# Patient Record
Sex: FEMALE | Race: BLACK OR AFRICAN AMERICAN | Employment: FULL TIME | URBAN - METROPOLITAN AREA
[De-identification: names, ages, dates, MRNs, and addresses within clinical notes are randomized per-mention and may not be internally consistent; named-entity substitution may affect disease eponyms.]

---

## 2018-02-07 ENCOUNTER — HOSPITAL ENCOUNTER (OUTPATIENT)
Dept: MAMMOGRAPHY | Age: 48
Discharge: HOME | End: 2018-02-07
Attending: OBSTETRICS & GYNECOLOGY
Payer: COMMERCIAL

## 2018-02-07 DIAGNOSIS — Z12.31 VISIT FOR SCREENING MAMMOGRAM: ICD-10-CM

## 2018-02-07 PROCEDURE — 77067 SCR MAMMO BI INCL CAD: CPT

## 2019-02-11 ENCOUNTER — HOSPITAL ENCOUNTER (OUTPATIENT)
Dept: MAMMOGRAPHY | Age: 49
Discharge: HOME | End: 2019-02-11
Attending: OBSTETRICS & GYNECOLOGY
Payer: COMMERCIAL

## 2019-02-11 DIAGNOSIS — Z12.31 VISIT FOR SCREENING MAMMOGRAM: ICD-10-CM

## 2019-02-11 PROCEDURE — 77067 SCR MAMMO BI INCL CAD: CPT

## 2019-03-26 PROBLEM — Z12.11 SCREENING FOR COLON CANCER: Status: ACTIVE | Noted: 2019-03-26

## 2019-09-30 ENCOUNTER — APPOINTMENT (OUTPATIENT)
Dept: CT IMAGING | Age: 49
End: 2019-09-30
Attending: EMERGENCY MEDICINE
Payer: COMMERCIAL

## 2019-09-30 ENCOUNTER — HOSPITAL ENCOUNTER (EMERGENCY)
Age: 49
Discharge: HOME | End: 2019-10-01
Attending: EMERGENCY MEDICINE
Payer: COMMERCIAL

## 2019-09-30 VITALS
RESPIRATION RATE: 16 BRPM | BODY MASS INDEX: 30.9 KG/M2 | TEMPERATURE: 97.6 F | HEART RATE: 60 BPM | OXYGEN SATURATION: 100 % | WEIGHT: 180 LBS | SYSTOLIC BLOOD PRESSURE: 158 MMHG | DIASTOLIC BLOOD PRESSURE: 85 MMHG

## 2019-09-30 DIAGNOSIS — R42 DIZZINESS: Primary | ICD-10-CM

## 2019-09-30 LAB
ALBUMIN SERPL-MCNC: 3.6 G/DL (ref 3.4–5)
ALBUMIN/GLOB SERPL: 0.8 {RATIO} (ref 0.8–1.7)
ALP SERPL-CCNC: 96 U/L (ref 45–117)
ALT SERPL-CCNC: 30 U/L (ref 13–56)
ANION GAP SERPL CALC-SCNC: 6 MMOL/L (ref 3–18)
AST SERPL-CCNC: 24 U/L (ref 10–38)
BASOPHILS # BLD: 0 K/UL (ref 0–0.1)
BASOPHILS NFR BLD: 1 % (ref 0–2)
BILIRUB SERPL-MCNC: 0.4 MG/DL (ref 0.2–1)
BUN SERPL-MCNC: 13 MG/DL (ref 7–18)
BUN/CREAT SERPL: 15 (ref 12–20)
CALCIUM SERPL-MCNC: 9.3 MG/DL (ref 8.5–10.1)
CHLORIDE SERPL-SCNC: 108 MMOL/L (ref 100–111)
CK MB CFR SERPL CALC: NORMAL % (ref 0–4)
CK MB SERPL-MCNC: <1 NG/ML (ref 5–25)
CK SERPL-CCNC: 164 U/L (ref 26–192)
CO2 SERPL-SCNC: 28 MMOL/L (ref 21–32)
CREAT SERPL-MCNC: 0.87 MG/DL (ref 0.6–1.3)
DIFFERENTIAL METHOD BLD: ABNORMAL
EOSINOPHIL # BLD: 0.2 K/UL (ref 0–0.4)
EOSINOPHIL NFR BLD: 4 % (ref 0–5)
ERYTHROCYTE [DISTWIDTH] IN BLOOD BY AUTOMATED COUNT: 12.7 % (ref 11.6–14.5)
GLOBULIN SER CALC-MCNC: 4.4 G/DL (ref 2–4)
GLUCOSE SERPL-MCNC: 119 MG/DL (ref 74–99)
HCT VFR BLD AUTO: 36.2 % (ref 35–45)
HGB BLD-MCNC: 11.8 G/DL (ref 12–16)
LYMPHOCYTES # BLD: 2.6 K/UL (ref 0.9–3.6)
LYMPHOCYTES NFR BLD: 43 % (ref 21–52)
MCH RBC QN AUTO: 29.6 PG (ref 24–34)
MCHC RBC AUTO-ENTMCNC: 32.6 G/DL (ref 31–37)
MCV RBC AUTO: 91 FL (ref 74–97)
MONOCYTES # BLD: 0.3 K/UL (ref 0.05–1.2)
MONOCYTES NFR BLD: 4 % (ref 3–10)
NEUTS SEG # BLD: 3 K/UL (ref 1.8–8)
NEUTS SEG NFR BLD: 48 % (ref 40–73)
PLATELET # BLD AUTO: 315 K/UL (ref 135–420)
PMV BLD AUTO: 9.4 FL (ref 9.2–11.8)
POTASSIUM SERPL-SCNC: 3.6 MMOL/L (ref 3.5–5.5)
PROT SERPL-MCNC: 8 G/DL (ref 6.4–8.2)
RBC # BLD AUTO: 3.98 M/UL (ref 4.2–5.3)
SODIUM SERPL-SCNC: 142 MMOL/L (ref 136–145)
TROPONIN I SERPL-MCNC: <0.02 NG/ML (ref 0–0.04)
WBC # BLD AUTO: 6.1 K/UL (ref 4.6–13.2)

## 2019-09-30 PROCEDURE — 85025 COMPLETE CBC W/AUTO DIFF WBC: CPT

## 2019-09-30 PROCEDURE — 93005 ELECTROCARDIOGRAM TRACING: CPT

## 2019-09-30 PROCEDURE — 82550 ASSAY OF CK (CPK): CPT

## 2019-09-30 PROCEDURE — 94760 N-INVAS EAR/PLS OXIMETRY 1: CPT

## 2019-09-30 PROCEDURE — 99284 EMERGENCY DEPT VISIT MOD MDM: CPT

## 2019-09-30 PROCEDURE — 80053 COMPREHEN METABOLIC PANEL: CPT

## 2019-09-30 PROCEDURE — 70450 CT HEAD/BRAIN W/O DYE: CPT

## 2019-09-30 PROCEDURE — 74011250636 HC RX REV CODE- 250/636: Performed by: EMERGENCY MEDICINE

## 2019-09-30 PROCEDURE — 99285 EMERGENCY DEPT VISIT HI MDM: CPT

## 2019-09-30 RX ORDER — MECLIZINE HCL 12.5 MG 12.5 MG/1
25 TABLET ORAL DAILY
Status: DISCONTINUED | OUTPATIENT
Start: 2019-10-01 | End: 2019-10-01 | Stop reason: HOSPADM

## 2019-09-30 RX ADMIN — MECLIZINE 25 MG: 12.5 TABLET ORAL at 22:42

## 2019-09-30 NOTE — LETTER
[address] [place] [address] [facility] 
[address] [facility] [id number] [phone] Work/School Note Date: 9/30/2019 To Whom It May concern: 
 
Alex Miranda was seen and treated today in the emergency room by the following provider(s): 
Attending Provider: Edenilson Bella MD.   
 
 
Alex Miranda - please excuse her from work 10/1 and 10/2 (2019). Sincerely, Coni Eubanks RN

## 2019-10-01 LAB
ATRIAL RATE: 56 BPM
CALCULATED P AXIS, ECG09: 50 DEGREES
CALCULATED R AXIS, ECG10: 21 DEGREES
CALCULATED T AXIS, ECG11: 37 DEGREES
DIAGNOSIS, 93000: NORMAL
P-R INTERVAL, ECG05: 200 MS
Q-T INTERVAL, ECG07: 428 MS
QRS DURATION, ECG06: 88 MS
QTC CALCULATION (BEZET), ECG08: 413 MS
VENTRICULAR RATE, ECG03: 56 BPM

## 2019-10-01 RX ORDER — MECLIZINE HYDROCHLORIDE 25 MG/1
TABLET ORAL
Qty: 20 TAB | Refills: 0 | Status: SHIPPED | OUTPATIENT
Start: 2019-10-01

## 2019-10-01 NOTE — DISCHARGE INSTRUCTIONS
Patient Education        Epley Maneuver at Home for Vertigo: Exercises  Introduction  Vertigo is a spinning or whirling sensation when you move your head. Your doctor may have moved you in different positions to help your vertigo get better faster. This is called the Epley maneuver. Your doctor also may have asked you to do these exercises at home. Do the exercises as often as your doctor recommends. If your vertigo is getting worse, your doctor may have you change the exercise or stop it. Step 1  Step 1    1. Sit on the edge of a bed or sofa. Step 2    1. Turn your head 45 degrees in the direction your doctor told you to. This should be toward the ear that causes the most vertigo for you. In this picture, the woman is turning toward her left ear. Step 3    1. Tilt yourself backward until you are lying on your back. Your head should still be at a 45-degree turn. Your head should be about midway between looking straight ahead and looking out to your side. Hold for 30 seconds. If you have vertigo, stay in this position until it stops. Step 4    1. Turn your head 90 degrees toward the ear that has the least vertigo. In this picture, the woman is turning to the right because she has vertigo on her left side. The point of your chin should be raised and over your shoulder. Hold for 30 seconds. Step 5    1. Roll onto the side with the least vertigo. You should now be looking at the floor. Hold for 30 seconds. Follow-up care is a key part of your treatment and safety. Be sure to make and go to all appointments, and call your doctor if you are having problems. It's also a good idea to know your test results and keep a list of the medicines you take. Where can you learn more? Go to http://lori-donnell.info/. Enter 470 5122 in the search box to learn more about \"Epley Maneuver at Home for Vertigo: Exercises. \"  Current as of: March 28, 2019  Content Version: 12.2  © 7271-3284 Healthwise[employer]. Care instructions adapted under license by [employer] (which disclaims liability or warranty for this information). If you have questions about a medical condition or this instruction, always ask your healthcare professional. [address] any warranty or liability for your use of this information. Patient Education        Lightheadedness or Faintness: Care Instructions  Your Care Instructions  Lightheadedness is a feeling that you are about to faint or \"pass out. \" You do not feel as if you or your surroundings are moving. It is different from vertigo, which is the feeling that you or things around you are spinning or tilting. Lightheadedness usually goes away or gets better when you lie down. If lightheadedness gets worse, it can lead to a fainting spell. It is common to feel lightheaded from time to time. Lightheadedness usually is not caused by a serious problem. It often is caused by a short-lasting drop in blood pressure and blood flow to your head that occurs when you get up too quickly from a seated or lying position. Follow-up care is a key part of your treatment and safety. Be sure to make and go to all appointments, and call your doctor if you are having problems. It's also a good idea to know your test results and keep a list of the medicines you take. How can you care for yourself at home? · Lie down for 1 or 2 minutes when you feel lightheaded. After lying down, sit up slowly and remain sitting for 1 to 2 minutes before slowly standing up. · Avoid movements, positions, or activities that have made you lightheaded in the past.  · Get plenty of rest, especially if you have a cold or flu, which can cause lightheadedness. · Make sure you drink plenty of fluids, especially if you have a fever or have been sweating. · Do not drive or put yourself and others in danger while you feel lightheaded. When should you call for help?   Call 911 anytime you think you may need emergency care. For example, call if:    · You have symptoms of a stroke. These may include:  ? Sudden numbness, tingling, weakness, or loss of movement in your face, arm, or leg, especially on only one side of your body. ? Sudden vision changes. ? Sudden trouble speaking. ? Sudden confusion or trouble understanding simple statements. ? Sudden problems with walking or balance. ? A sudden, severe headache that is different from past headaches.     · You have symptoms of a heart attack. These may include:  ? Chest pain or pressure, or a strange feeling in the chest.  ? Sweating. ? Shortness of breath. ? Nausea or vomiting. ? Pain, pressure, or a strange feeling in the back, neck, jaw, or upper belly or in one or both shoulders or arms. ? Lightheadedness or sudden weakness. ? A fast or irregular heartbeat. After you call 911, the  may tell you to chew 1 adult-strength or 2 to 4 low-dose aspirin. Wait for an ambulance. Do not try to drive yourself.    Watch closely for changes in your health, and be sure to contact your doctor if:    · Your lightheadedness gets worse or does not get better with home care. Where can you learn more? Go to http://lori-donnell.info/. Enter I229 in the search box to learn more about \"Lightheadedness or Faintness: Care Instructions. \"  Current as of: June 26, 2019  Content Version: 12.2  © 3508-2804 [employer]. Care instructions adapted under license by [employer] (which disclaims liability or warranty for this information). If you have questions about a medical condition or this instruction, always ask your healthcare professional. [place] [address] any warranty or liability for your use of this information.

## 2019-10-01 NOTE — ED NOTES
1:39 AM  10/01/19     Discharge instructions given to Solis Armas (name) with verbalization of understanding. Patient accompanied by family. Patient discharged with the following prescriptions antivert. Patient discharged to home (destination).       Yvrose Mccann

## 2019-10-01 NOTE — ED NOTES
Pt affect calm/conversant, respirations regular/non labored/skin warm and dry. Denies pain/dizziness/shortness of breath/other concerns. Plan of care to include CT of the head. Pt denies further needs at this time.   Rad tech at bedside; pt to CT

## 2019-10-01 NOTE — ED TRIAGE NOTES
C/O dizziness starting around 2000 when she got home from work. Pt states \"It hit me all of a sudden, but I have just been getting over a bad cold\".

## 2019-10-01 NOTE — ED PROVIDER NOTES
HPI  Bianka Camp is a 45 yo female developed a sudden onset of dizziness starting around  when she got home from work. Pt states \"It hit me all of a sudden, but I have just been getting over a bad cold\". She describes the dizziness as lightheadedness with the room spinning.     Past Medical History:   Diagnosis Date    Hypertension        Past Surgical History:   Procedure Laterality Date    COLONOSCOPY N/A 3/26/2019    COLONOSCOPY,  w heated snared polypectomy performed by Beto Green MD at [facility] ENDOSCOPY    HX BREAST BIOPSY  2013    HX CHOLECYSTECTOMY      MULTIPLE DELIVERY       1986         Family History:   Problem Relation Age of Onset    SLE Mother        Social History     Socioeconomic History    Marital status: SINGLE     Spouse name: Not on file    Number of children: Not on file    Years of education: Not on file    Highest education level: Not on file   Occupational History    Not on file   Social Needs    Financial resource strain: Not on file    Food insecurity:     Worry: Not on file     Inability: Not on file    Transportation needs:     Medical: Not on file     Non-medical: Not on file   Tobacco Use    Smoking status: Never Smoker   Substance and Sexual Activity    Alcohol use: No    Drug use: No    Sexual activity: Not on file   Lifestyle    Physical activity:     Days per week: Not on file     Minutes per session: Not on file    Stress: Not on file   Relationships    Social connections:     Talks on phone: Not on file     Gets together: Not on file     Attends Anabaptism service: Not on file     Active member of club or organization: Not on file     Attends meetings of clubs or organizations: Not on file     Relationship status: Not on file    Intimate partner violence:     Fear of current or ex partner: Not on file     Emotionally abused: Not on file     Physically abused: Not on file     Forced sexual activity: Not on file   Other Topics Concern    Not on file   Social History Narrative    Not on file         ALLERGIES: Patient has no known allergies. Review of Systems   Constitutional: Negative. HENT: Negative. Eyes: Negative. Respiratory: Negative. Cardiovascular: Negative. Gastrointestinal: Negative. Endocrine: Negative. Genitourinary: Negative. Musculoskeletal: Negative. Skin: Negative. Allergic/Immunologic: Negative. Neurological: Negative. Hematological: Negative. Psychiatric/Behavioral: Negative. All other systems reviewed and are negative. Vitals:    09/30/19 2232   BP: 158/85   Pulse: 60   Resp: 16   Temp: 97.6 °F (36.4 °C)   SpO2: 100%   Weight: 81.6 kg (180 lb)            Physical Exam   Constitutional: She is oriented to person, place, and time. She appears well-developed and well-nourished. No distress. HENT:   Head: Normocephalic. Right Ear: External ear normal.   Left Ear: External ear normal.   Mouth/Throat: No oropharyngeal exudate. Eyes: Pupils are equal, round, and reactive to light. Conjunctivae and EOM are normal. Right eye exhibits no discharge. Left eye exhibits no discharge. No scleral icterus. Neck: Normal range of motion. Neck supple. No JVD present. No tracheal deviation present. No thyromegaly present. Cardiovascular: Normal rate, regular rhythm, normal heart sounds and intact distal pulses. Exam reveals no gallop and no friction rub. No murmur heard. Pulmonary/Chest: Effort normal and breath sounds normal. No stridor. No respiratory distress. She has no wheezes. She has no rales. She exhibits no tenderness. Abdominal: Soft. Bowel sounds are normal. She exhibits no distension and no mass. There is no tenderness. There is no rebound and no guarding. Musculoskeletal: Normal range of motion. She exhibits no edema or tenderness. Lymphadenopathy:     She has no cervical adenopathy. Neurological: She is alert and oriented to person, place, and time.  She displays normal reflexes. No cranial nerve deficit. She exhibits normal muscle tone. Coordination normal.   Skin: Skin is warm and dry. No rash noted. She is not diaphoretic. No erythema. No pallor. Nursing note and vitals reviewed. MDM: Differential diagnosis: URI, viral syndrome, labyrinthitis, TIA    Course: Patient remained stable    Diagnosis:    Disposition: Discharge home    Dictation disclaimer:  Please note that this dictation was completed with [email or web address], the computer voice recognition software. Quite often unanticipated grammatical, syntax, homophones, and other interpretive errors are inadvertently transcribed by the computer software. Please disregard these errors. Please excuse any errors that have escaped final proofreading.

## 2020-02-26 ENCOUNTER — HOSPITAL ENCOUNTER (OUTPATIENT)
Dept: MAMMOGRAPHY | Age: 50
Discharge: HOME | End: 2020-02-26
Attending: OBSTETRICS & GYNECOLOGY
Payer: COMMERCIAL

## 2020-02-26 DIAGNOSIS — Z12.31 VISIT FOR SCREENING MAMMOGRAM: ICD-10-CM

## 2020-02-26 PROCEDURE — 77063 BREAST TOMOSYNTHESIS BI: CPT

## 2020-06-09 ENCOUNTER — HOSPITAL ENCOUNTER (OUTPATIENT)
Dept: PHYSICAL THERAPY | Age: 50
Discharge: HOME | End: 2020-06-09
Payer: COMMERCIAL

## 2020-06-09 PROCEDURE — 97110 THERAPEUTIC EXERCISES: CPT

## 2020-06-09 PROCEDURE — 97162 PT EVAL MOD COMPLEX 30 MIN: CPT

## 2020-06-09 NOTE — PROGRESS NOTES
PT DAILY TREATMENT NOTE 10-18    Patient Name: Maverick Pink  Date:2020  : 1970  [x]  Patient  Verified  Payor: BLUE CROSS / Plan: [facility] PPO / Product Type: PPO /    In time:8:00  Out time:8:47  Total Treatment Time (min): 47  Visit #: 1 of 8    Medicare/BCBS Only   Total Timed Codes (min):  47 1:1 Treatment Time:  47       Treatment Area: Pain in right shoulder [M25.511]  Lumbar back pain [M54.5]    SUBJECTIVE  Pain Level (0-10 scale): 0/10  Any medication changes, allergies to medications, adverse drug reactions, diagnosis change, or new procedure performed?: [x] No    [] Yes (see summary sheet for update)  Subjective functional status/changes:   [] No changes reported  The patient has a chief complaint of right shoulder and back pain limiting ease of chore production and lifting. OBJECTIVE  37 min [x]Eval                  []Re-Eval       10 min Therapeutic Exercise:  [x] See flow sheet :   Rationale: increase ROM and increase strength to improve the patients ability to improve ADL ease. With   [] TE   [] TA   [] neuro   [] other: Patient Education: [x] Review HEP    [] Progressed/Changed HEP based on:   [] positioning   [] body mechanics   [] transfers   [] heat/ice application    [] other:      Other Objective/Functional Measures: See IE     Pain Level (0-10 scale) post treatment: 5/10    ASSESSMENT/Changes in Function: See POC. Patient will continue to benefit from skilled PT services to modify and progress therapeutic interventions, address functional mobility deficits, address ROM deficits, address strength deficits, analyze and address soft tissue restrictions, analyze and cue movement patterns, analyze and modify body mechanics/ergonomics, assess and modify postural abnormalities and instruct in home and community integration to attain remaining goals.      [x]  See Plan of Care  []  See progress note/recertification  []  See Discharge Summary         Progress towards goals / Updated goals:  Short Term Goals: To be accomplished in 2 weeks:               1. The patient will be independent and complaint with HEP to maximize therapeutic benefit. 2. The patient will improve lumbar flexion to finger tips to distal 1/3rd of tibia to maximize ease of chore production. Long Term Goals: To be accomplished in 4 weeks:               1. The patient will improve FOTO score to 56 to maximize quality of life. 2. The patient will improve right shoulder posterior capsule 90/90 IR to 30 degrees to improve ease of grooming. 3. The patient will demonstrate overhead reaching to 140 degrees to improve ease of overhead reaching. 4. The patient will report 75% improvement since [facility] to improve ease of ADLs.      PLAN  []  Upgrade activities as tolerated     [x]  Continue plan of care  []  Update interventions per flow sheet       []  Discharge due to:_  []  Other:_      Bee Hudson, PT 6/9/2020  8:57 AM    Future Appointments   Date Time Provider Claudia Brown   6/16/2020  8:00 AM Conda Cons, PT [facility]   6/19/2020  8:15 AM Hunter Walker, PT [facility]   6/23/2020  8:00 AM Conda Cons, PT [facility]   6/26/2020  7:30 AM Gopal Gutierrez, PT [facility]   6/30/2020  8:00 AM Conda Cons, PT [facility]   7/2/2020  8:15 AM Gopal Gutierrez, PT [facility]   7/7/2020  8:00 AM Conda Cons, PT [facility]   7/10/2020  8:15 AM Hunter Walker, PT [facility]
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
## 2020-06-16 ENCOUNTER — HOSPITAL ENCOUNTER (OUTPATIENT)
Dept: PHYSICAL THERAPY | Age: 50
Discharge: HOME | End: 2020-06-16
Payer: COMMERCIAL

## 2020-06-16 PROCEDURE — 97110 THERAPEUTIC EXERCISES: CPT

## 2020-06-16 PROCEDURE — 97140 MANUAL THERAPY 1/> REGIONS: CPT

## 2020-06-16 NOTE — PROGRESS NOTES
PT DAILY TREATMENT NOTE 10-18    Patient Name: Renetta Miles  Date:2020  : 1970  [x]  Patient  Verified  Payor: BLUE CROSS / Plan: [facility] PPO / Product Type: PPO /    In time:8:00  Out time:8:50  Total Treatment Time (min): 50  Visit #: 2 of 8    Medicare/BCBS Only   Total Timed Codes (min):  45 1:1 Treatment Time:  45       Treatment Area: Pain in right shoulder [M25.511]  Lumbar back pain [M54.5]    SUBJECTIVE  Pain Level (0-10 scale): 3/10  Any medication changes, allergies to medications, adverse drug reactions, diagnosis change, or new procedure performed?: [x] No    [] Yes (see summary sheet for update)  Subjective functional status/changes:   [] No changes reported  The patient states that her pain level is down, and she does report compliance with her HEP. OBJECTIVE  Modality rationale: decrease pain and increase tissue extensibility to improve the patients ability to improve ADL ease. Min Type Additional Details   5 []  Ice     [x]  heat  []  Ice massage  []  Laser   []  Anodyne Position: seated  Location: right shoulder   [] Skin assessment post-treatment:  []intact []redness- no adverse reaction    []redness  adverse reaction:       38 min Therapeutic Exercise:  [x] See flow sheet :   Rationale: increase ROM and increase strength to improve the patients ability to improve ADL ease. 8 min Manual Therapy:  Right GHJ emphasis of posterior capsule mobs per formed with the patient in supine at a grade III. Rationale: decrease pain, increase ROM and increase tissue extensibility to improve ADL ease. With   [] TE   [] TA   [] neuro   [] other: Patient Education: [x] Review HEP    [] Progressed/Changed HEP based on:   [] positioning   [] body mechanics   [] transfers   [] heat/ice application    [] other:      Other Objective/Functional Measures:   HEP compliance reported. Also notable improvement of posterior capsule reported.   Fatigued quickly with posterior pelvic tilting and core interventions. Pain Level (0-10 scale) post treatment: 5/10    ASSESSMENT/Changes in Function: Good first follow-up with the patient motivated and eager to follow instruction and perform exercises. Her passive mobility concerning her right shoulder grossly is improving. Patient will continue to benefit from skilled PT services to modify and progress therapeutic interventions, address functional mobility deficits, address ROM deficits, address strength deficits, analyze and address soft tissue restrictions, analyze and cue movement patterns, analyze and modify body mechanics/ergonomics, assess and modify postural abnormalities and instruct in home and community integration to attain remaining goals. []  See Plan of Care  []  See progress note/recertification  []  See Discharge Summary         Progress towards goals / Updated goals:  Short Term Goals: To be accomplished in 2 weeks:               1. The patient will be independent and complaint with HEP to maximize therapeutic benefit. IE: issued HEP   Current: Reports compliance with HEP 6/16/2020               2. The patient will improve lumbar flexion to finger tips to distal 1/3rd of tibia to maximize ease of chore production. IE: to tibial tuberosity  Long Term Goals: To be accomplished in 4 weeks:               1. The patient will improve FOTO score to 56 to maximize quality of life. IE: 50               2. The patient will improve right shoulder posterior capsule 90/90 IR to 30 degrees to improve ease of grooming. IE: 0 degrees right shoulder posterior capsule at 90/90               3. The patient will demonstrate overhead reaching to 140 degrees to improve ease of overhead reaching. IE: right shoulder 78 degrees               4. The patient will report 75% improvement since [facility] to improve ease of ADLs.    IE: 0% reported at IE.      PLAN  []  Upgrade activities as tolerated     [x]  Continue plan of care  [] Update interventions per flow sheet       []  Discharge due to:_  []  Other:_      Anil Early, PT 6/16/2020  8:13 AM    Future Appointments   Date Time Provider Claudia Brown   6/19/2020  8:15 AM Homar Goarlenele, PT [facility]   6/23/2020  8:00 AM Shola Walker, PT [facility]   6/26/2020  7:30 AM Renee Gutierrez, PT [facility]   6/30/2020  8:00 AM Homar Goarlenele, PT [facility]   7/2/2020  8:15 AM Renee Gutierrez, PT [facility]   7/7/2020  8:00 AM Homar Gosha, PT [facility]   7/10/2020  8:15 AM Shola Walker, PT [facility]

## 2020-06-23 ENCOUNTER — APPOINTMENT (OUTPATIENT)
Dept: PHYSICAL THERAPY | Age: 50
End: 2020-06-23
Payer: COMMERCIAL

## 2020-06-30 ENCOUNTER — HOSPITAL ENCOUNTER (OUTPATIENT)
Dept: PHYSICAL THERAPY | Age: 50
Discharge: HOME | End: 2020-06-30
Payer: COMMERCIAL

## 2020-06-30 PROCEDURE — 97140 MANUAL THERAPY 1/> REGIONS: CPT

## 2020-06-30 PROCEDURE — 97110 THERAPEUTIC EXERCISES: CPT

## 2020-06-30 NOTE — PROGRESS NOTES
PT DAILY TREATMENT NOTE 10-18    Patient Name: Cecily Huerta  Date:2020  : 1970  [x]  Patient  Verified  Payor: BLUE CROSS / Plan: [facility] PPO / Product Type: PPO /    In time:8:04  Out time:9:09  Total Treatment Time (min): 72  Visit #: 3 of 8    Medicare/BCBS Only   Total Timed Codes (min):  55 1:1 Treatment Time:  50       Treatment Area: Pain in right shoulder [M25.511]  Lumbar back pain [M54.5]    SUBJECTIVE  Pain Level (0-10 scale): Sh-6, B-8  Any medication changes, allergies to medications, adverse drug reactions, diagnosis change, or new procedure performed?: [x] No    [] Yes (see summary sheet for update)  Subjective functional status/changes:   [] No changes reported  Pt reports her right shoulder is aching today but its her low back that is giving her more pain. OBJECTIVE    Modality rationale: decrease pain and increase tissue extensibility to improve the patients ability to preform functional mobility.    Min Type Additional Details    [] Estim:  []Unatt       []IFC  []Premod                        []Other:  []w/ice   []w/heat  Position:  Location:    [] Estim: []Att    []TENS instruct  []NMES                    []Other:  []w/US   []w/ice   []w/heat  Position:  Location:    []  Traction: [] Cervical       []Lumbar                       [] Prone          []Supine                       []Intermittent   []Continuous Lbs:  [] before manual  [] after manual    []  Ultrasound: []Continuous   [] Pulsed                           []1MHz   []3MHz W/cm2:  Location:    []  Iontophoresis with dexamethasone         Location: [] Take home patch   [] In clinic   10 []  Ice     [x]  heat  []  Ice massage  []  Laser   []  Anodyne Position: seated  Location: right shoulder    []  Laser with stim  []  Other:  Position:  Location:    []  Vasopneumatic Device Pressure:       [] lo [] med [] hi   Temperature: [] lo [] med [] hi   [] Skin assessment post-treatment:  []intact []redness- no adverse reaction    []redness  adverse reaction:       43 min Therapeutic Exercise:  [x] See flow sheet :   Rationale: increase ROM and increase strength to improve the patients ability to preform functional task with ease. 12 min Manual Therapy:  Right GHJ emphasis of posterior capsule mobs with patient in supine at a grade 2-3. STM to L/S paraspinals in prone. Rationale: decrease pain, increase ROM and increase tissue extensibility to improve functional mobility with overhead reaching. With   [] TE   [] TA   [] neuro   [] other: Patient Education: [x] Review HEP    [] Progressed/Changed HEP based on:   [] positioning   [] body mechanics   [] transfers   [] heat/ice application    [] other:      Other Objective/Functional Measures:      Pain Level (0-10 scale) post treatment: 3    ASSESSMENT/Changes in Function:   UT Compensation noted with flexion of right UE with pt requiring cueing to relax shoulders, pt displayed good carryover after correction. Pt educated in the use of a towel roll in the lordosis to aid with increased L/S support and to improve posture to aid with decreasing pain. Empazised core engagement with pelvic tilting to aid with support of L/S with back exercises to help decreased pain with ADL's. Pt reports decreased pain post treatment. Patient will continue to benefit from skilled PT services to modify and progress therapeutic interventions, address functional mobility deficits, address ROM deficits, address strength deficits, analyze and address soft tissue restrictions, analyze and cue movement patterns, analyze and modify body mechanics/ergonomics and assess and modify postural abnormalities to attain remaining goals. [x]  See Plan of Care  []  See progress note/recertification  []  See Discharge Summary         Progress towards goals / Updated goals:   Short Term Goals: To be accomplished in 2 weeks:               1.  The patient will be independent and complaint with HEP to maximize therapeutic benefit. IE: issued HEP              Current: Reports compliance with HEP 6/16/2020               2. The patient will improve lumbar flexion to finger tips to distal 1/3rd of tibia to maximize ease of chore production. IE: to tibial tuberosity   Current: B ankles (7/10 pain). 6/30/2020  Long Term Goals: To be accomplished in 4 weeks:               1. The patient will improve FOTO score to 56 to maximize quality of life. IE: 50               2. The patient will improve right shoulder posterior capsule 90/90 IR to 30 degrees to improve ease of grooming. IE: 0 degrees right shoulder posterior capsule at 90/90               3. The patient will demonstrate overhead reaching to 140 degrees to improve ease of overhead reaching. IE: right shoulder 78 degrees               4. The patient will report 75% improvement since [facility] to improve ease of ADLs. IE: 0% reported at IE.      PLAN  []  Upgrade activities as tolerated     [x]  Continue plan of care  []  Update interventions per flow sheet       []  Discharge due to:_  []  Other:_      Barry Greene PTA 6/30/2020  7:54 AM    Future Appointments   Date Time Provider Claudia Brown   6/30/2020  8:00 AM Gardenia Foley, PTA [facility]   7/2/2020  8:15 AM Evans Gutierrez, PT [facility]   7/7/2020  8:00 AM Evangelista Garcia, PT [facility]   7/10/2020  8:15 AM Mandy Walker, PT [facility]

## 2020-07-02 ENCOUNTER — APPOINTMENT (OUTPATIENT)
Dept: PHYSICAL THERAPY | Age: 50
End: 2020-07-02

## 2020-07-24 NOTE — PROGRESS NOTES
[facility]  [address]  [place], [address]  [phone] fax    Physical Therapy Discharge Summary    Patient name: Betsy Cadena Start of Care: 2020   Referral source: Hallie Gavin MD : 1970                Medical Diagnosis: Pain in right shoulder [M25.511]  Lumbar back pain [M54.5]  Payor: BLUE CROSS / Plan: [id number] PPO / Product Type: PPO /  Onset Date:2 months ago shoulder, chronic LBP                Treatment Diagnosis: LBP, right shoulder   Prior Hospitalization: see medical history Provider#: [id number]   Medications: Verified on Patient summary List    Comorbidities: HTN   Prior Level of Function: The patient states she had improved ease of reaching overhead and sleeping throughout the night prior to onset. Visits from Start of Care: 3    Missed Visits: 4  Reporting Period : 2020 to 2020    Summary of Care:  Short Term Goals: To be accomplished in 2 weeks:               1. The patient will be independent and complaint with HEP to maximize therapeutic benefit.              IE: issued HEP              BDOPJUB: Reports compliance with HEP 2020               2. The patient will improve lumbar flexion to finger tips to distal 1/3rd of tibia to maximize ease of chore production.              IE: to tibial tuberosity              Current: B ankles (7/10 pain). 2020  Long Term Goals: To be accomplished in 4 weeks:               1. The patient will improve FOTO score to 56 to maximize quality of life.              IE: 48               2. The patient will improve right shoulder posterior capsule 90/90 IR to 30 degrees to improve ease of grooming.              IE: 0 degrees right shoulder posterior capsule at 90/90               3. The patient will demonstrate overhead reaching to 140 degrees to improve ease of overhead reaching.              IE: right shoulder 78 degrees               4.  The patient will report 75% improvement since [facility] to improve ease of ADLs.             BV: 0% reported at IE.        ASSESSMENT/RECOMMENDATIONS: The patient improved lumbar range of motion per above goals, but unable to further assess goals due to pt abdicating. Unable to further assess progress towards goals at this time due to non-compliance/lack of attendance. DC at this time with no further instructions to the patient.   Thank you for this referral.    [x]Discontinue therapy: []Patient has reached or is progressing toward set goals      [x]Patient is non-compliant or has abdicated      []Due to lack of appreciable progress towards set [address], PT 7/24/2020 8:04 AM

## 2021-02-11 ENCOUNTER — TRANSCRIBE ORDER (OUTPATIENT)
Dept: SCHEDULING | Age: 51
End: 2021-02-11

## 2021-02-11 DIAGNOSIS — Z12.31 VISIT FOR SCREENING MAMMOGRAM: Primary | ICD-10-CM

## 2021-02-11 DIAGNOSIS — M25.50 PAIN IN UNSPECIFIED JOINT: Primary | ICD-10-CM

## 2021-02-25 ENCOUNTER — HOSPITAL ENCOUNTER (OUTPATIENT)
Age: 51
Discharge: HOME | End: 2021-02-25
Attending: INTERNAL MEDICINE
Payer: COMMERCIAL

## 2021-02-25 DIAGNOSIS — M25.50 PAIN IN UNSPECIFIED JOINT: ICD-10-CM

## 2021-02-25 PROCEDURE — 73221 MRI JOINT UPR EXTREM W/O DYE: CPT

## 2021-03-26 ENCOUNTER — OFFICE VISIT (OUTPATIENT)
Dept: ORTHOPEDIC SURGERY | Age: 51
End: 2021-03-26
Payer: COMMERCIAL

## 2021-03-26 VITALS
RESPIRATION RATE: 16 BRPM | OXYGEN SATURATION: 99 % | HEART RATE: 73 BPM | HEIGHT: 64 IN | BODY MASS INDEX: 33.57 KG/M2 | WEIGHT: 196.6 LBS | TEMPERATURE: 97.3 F

## 2021-03-26 DIAGNOSIS — G89.29 CHRONIC LEFT SHOULDER PAIN: Primary | ICD-10-CM

## 2021-03-26 DIAGNOSIS — M25.512 CHRONIC LEFT SHOULDER PAIN: Primary | ICD-10-CM

## 2021-03-26 DIAGNOSIS — S42.232P: ICD-10-CM

## 2021-03-26 PROCEDURE — 73030 X-RAY EXAM OF SHOULDER: CPT | Performed by: ORTHOPAEDIC SURGERY

## 2021-03-26 PROCEDURE — 99204 OFFICE O/P NEW MOD 45 MIN: CPT | Performed by: ORTHOPAEDIC SURGERY
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
## 2021-04-16 ENCOUNTER — OFFICE VISIT (OUTPATIENT)
Dept: ORTHOPEDIC SURGERY | Age: 51
End: 2021-04-16
Payer: COMMERCIAL

## 2021-04-16 VITALS
BODY MASS INDEX: 34.08 KG/M2 | RESPIRATION RATE: 16 BRPM | WEIGHT: 199.6 LBS | HEART RATE: 70 BPM | OXYGEN SATURATION: 97 % | TEMPERATURE: 97.1 F | HEIGHT: 64 IN

## 2021-04-16 DIAGNOSIS — S42.232P: Primary | ICD-10-CM

## 2021-04-16 PROCEDURE — 99213 OFFICE O/P EST LOW 20 MIN: CPT | Performed by: ORTHOPAEDIC SURGERY

## 2021-04-16 NOTE — PROGRESS NOTES
Patient: Dayanara Linares                MRN: [id number]       SSN: [id number]  YOB: 1970        AGE: 44 y.o. SEX: female  Body mass index is 34.26 kg/m². PCP: Natale Hatchet, MD  21    Chief Complaint: Left shoulder follow up    Pain 0/10    HPI: Dayanara Linares is a 44 y.o. female patient who returns to the office today for her left shoulder. She has no new complaints. Past Medical History:   Diagnosis Date    Hypertension        Family History   Problem Relation Age of Onset    SLE Mother        Current Outpatient Medications   Medication Sig Dispense Refill    meclizine (ANTIVERT) 25 mg tablet Take 1 tablet by mouth every 6 hours for the next 3 days. Then stop taking the meclizine. Restart the meclizine for 3 day intervals if vertigo/ dizziness returns.  20 Tab 0    metoprolol succinate (TOPROL XL) 50 mg XL tablet          No Known Allergies    Past Surgical History:   Procedure Laterality Date    COLONOSCOPY N/A 3/26/2019    COLONOSCOPY,  w heated snared polypectomy performed [facility] Yesenia Omalley MD at [facility]  2013    HX CHOLECYSTECTOMY      MULTIPLE DELIVERY       1986       Social History     Socioeconomic History    Marital status: SINGLE     Spouse name: Not on file    Number of children: Not on file    Years of education: Not on file    Highest education level: Not on file   Occupational History    Not on file   Social Needs    Financial resource strain: Not on file    Food insecurity     Worry: Not on file     Inability: Not on file    Transportation needs     Medical: Not on file     Non-medical: Not on file   Tobacco Use    Smoking status: Never Smoker    Smokeless tobacco: Never Used   Substance and Sexual Activity    Alcohol use: No    Drug use: No    Sexual activity: Not on file   Lifestyle    Physical activity     Days per week: Not on file     Minutes per session: Not on file    Stress: Not on file   Relationships    Social connections     Talks on phone: Not on file     Gets together: Not on file     Attends Mu-ism service: Not on file     Active member of club or organization: Not on file     Attends meetings of clubs or organizations: Not on file     Relationship status: Not on file    Intimate partner violence     Fear of current or ex partner: Not on file     Emotionally abused: Not on file     Physically abused: Not on file     Forced sexual activity: Not on file   Other Topics Concern    Not on file   Social History Narrative    Not on file       REVIEW OF SYSTEMS:      No changes from previous review of systems unless noted. PHYSICAL EXAMINATION:  Visit Vitals  Pulse 70   Temp 97.1 °F (36.2 °C) (Temporal)   Resp 16   Ht 5' 4\" (1.626 m)   Wt 199 lb 9.6 oz (90.5 kg)   SpO2 97%   BMI 34.26 kg/m²     Body mass index is 34.26 kg/m². GENERAL: Alert and oriented x3, in no acute distress. HEENT: Normocephalic, atraumatic. RESP: Non labored breathing. SKIN: No rashes or lesions noted. Examination of left shoulder notable for full passive range of motion including forward flexion external rotation internal rotation. Active range of motion is limited to approximate 120 degrees of forward flexion 30 degrees of external rotation and internal rotation to the hip. I do not appreciate any crepitus on range of motion. She is neurovascular intact otherwise. IMAGING:  Previous imaging was again reviewed which shows a malunited three-part left proximal humerus fracture with posterior and superior displacement of the greater tuberosity. ASSESSMENT & PLAN  Diagnosis: Left shoulder three-part malunited proximal humerus fracture    Ms. Rabia Webster has a malunited left shoulder proximal humerus fracture. I did have a lengthy discussion with her today again regarding her left shoulder problem.   Unfortunately this was a operative injury at the time of the injury by my clinical judgment but was not fixed and she has gone on to a malunion. Her greater tuberosity was displaced and healed in a nonanatomical position. This is her nondominant arm, she has minimal pain, her main complaint is lack of function. In my opinion any surgery to try to fix this would likely have the risks outweigh the benefits as she could have pain, she could have worse function or motion, or if she had another kind of complication such as an infection this would be worse off. Therefore I do not recommend surgery but I did recommend if she would like to be seen at a tertiary care facility or an academic facility such as [facility] I gave her the name of the surgeon at [facility] that I would recommend she be evaluated that and I placed an order should she decide to do that. She has been to give this some thought. All of her questions were answered. Follow-up as needed.       Electronically signed by: Janice Perales MD

## 2021-04-19 ENCOUNTER — HOSPITAL ENCOUNTER (OUTPATIENT)
Dept: MAMMOGRAPHY | Age: 51
Discharge: HOME | End: 2021-04-19
Attending: OBSTETRICS & GYNECOLOGY
Payer: COMMERCIAL

## 2021-04-19 DIAGNOSIS — Z12.31 VISIT FOR SCREENING MAMMOGRAM: ICD-10-CM

## 2021-04-19 PROCEDURE — 77063 BREAST TOMOSYNTHESIS BI: CPT

## 2021-04-30 ENCOUNTER — TRANSCRIBE ORDER (OUTPATIENT)
Dept: SCHEDULING | Age: 51
End: 2021-04-30

## 2021-04-30 DIAGNOSIS — R92.8 ABNORMAL MAMMOGRAM: Primary | ICD-10-CM

## 2021-05-06 ENCOUNTER — HOSPITAL ENCOUNTER (OUTPATIENT)
Dept: MAMMOGRAPHY | Age: 51
Discharge: HOME | End: 2021-05-06
Attending: OBSTETRICS & GYNECOLOGY
Payer: COMMERCIAL

## 2021-05-06 ENCOUNTER — HOSPITAL ENCOUNTER (OUTPATIENT)
Dept: ULTRASOUND IMAGING | Age: 51
Discharge: HOME | End: 2021-05-06
Attending: OBSTETRICS & GYNECOLOGY
Payer: COMMERCIAL

## 2021-05-06 DIAGNOSIS — N64.89 BREAST ASYMMETRY: ICD-10-CM

## 2021-05-06 DIAGNOSIS — R92.8 ABNORMAL MAMMOGRAM: ICD-10-CM

## 2021-05-06 PROCEDURE — 76642 ULTRASOUND BREAST LIMITED: CPT

## 2021-05-06 PROCEDURE — 77061 BREAST TOMOSYNTHESIS UNI: CPT

## 2021-06-17 ENCOUNTER — TELEPHONE (OUTPATIENT)
Dept: ORTHOPEDIC SURGERY | Age: 51
End: 2021-06-17

## 2021-06-17 NOTE — TELEPHONE ENCOUNTER
Ana Ceron from [facility] called checking on the status of the authorization needed for the referral from Dr. Tatyana Hodge to orthopedic surgery. She says [facility] requires [place] authorization for any out-of-state patients. Please advise anyone at [facility] back regarding this at [phone].

## 2021-06-25 NOTE — TELEPHONE ENCOUNTER
I have made several attempts to get a hold of someone at [facility] to get approval to be seen by Robert. I called the providers line sit on hold for ever and then when they answer they state I need utilization dept. They have yet to answer when I call them. I have called the members service line hoping to speak with someone and again all they want to do is transfer me. I think I have found a form on line I am going to fax to cornelio in hopes of getting auth.

## 2021-10-18 ENCOUNTER — TRANSCRIBE ORDER (OUTPATIENT)
Dept: SCHEDULING | Age: 51
End: 2021-10-18

## 2021-10-18 DIAGNOSIS — N64.4 PAIN IN BREAST: Primary | ICD-10-CM

## 2021-11-11 ENCOUNTER — HOSPITAL ENCOUNTER (OUTPATIENT)
Dept: MAMMOGRAPHY | Age: 51
Discharge: HOME | End: 2021-11-11
Attending: OBSTETRICS & GYNECOLOGY

## 2021-11-11 ENCOUNTER — HOSPITAL ENCOUNTER (OUTPATIENT)
Dept: ULTRASOUND IMAGING | Age: 51
Discharge: HOME | End: 2021-11-11
Attending: OBSTETRICS & GYNECOLOGY

## 2021-11-11 DIAGNOSIS — N64.4 PAIN IN BREAST: ICD-10-CM

## 2022-02-24 ENCOUNTER — TRANSCRIBE ORDER (OUTPATIENT)
Dept: SCHEDULING | Age: 52
End: 2022-02-24

## 2022-02-24 DIAGNOSIS — Z12.31 SCREENING MAMMOGRAM FOR HIGH-RISK PATIENT: Primary | ICD-10-CM

## 2022-02-25 ENCOUNTER — HOSPITAL ENCOUNTER (OUTPATIENT)
Dept: MAMMOGRAPHY | Age: 52
Discharge: HOME | End: 2022-02-25
Attending: OBSTETRICS & GYNECOLOGY

## 2022-02-25 DIAGNOSIS — Z12.31 SCREENING MAMMOGRAM FOR HIGH-RISK PATIENT: ICD-10-CM

## 2022-04-21 ENCOUNTER — HOSPITAL ENCOUNTER (OUTPATIENT)
Dept: MAMMOGRAPHY | Age: 52
Discharge: HOME | End: 2022-04-21
Attending: OBSTETRICS & GYNECOLOGY
Payer: COMMERCIAL

## 2022-04-21 PROCEDURE — 77067 SCR MAMMO BI INCL CAD: CPT
